# Patient Record
Sex: FEMALE | Race: WHITE | NOT HISPANIC OR LATINO | Employment: STUDENT | URBAN - METROPOLITAN AREA
[De-identification: names, ages, dates, MRNs, and addresses within clinical notes are randomized per-mention and may not be internally consistent; named-entity substitution may affect disease eponyms.]

---
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
## 2024-02-11 DIAGNOSIS — F41.1 GAD (GENERALIZED ANXIETY DISORDER): ICD-10-CM

## 2024-02-12 RX ORDER — PROPRANOLOL HYDROCHLORIDE 10 MG/1
10 TABLET ORAL 2 TIMES DAILY PRN
Qty: 180 TABLET | Refills: 0 | Status: SHIPPED | OUTPATIENT
Start: 2024-02-12 | End: 2024-05-16 | Stop reason: SDUPTHER

## 2024-05-06 ENCOUNTER — TELEPHONE (OUTPATIENT)
Dept: PSYCHIATRY | Facility: CLINIC | Age: 19
End: 2024-05-06
Payer: COMMERCIAL

## 2024-05-06 NOTE — TELEPHONE ENCOUNTER
----- Message from Shayna Taylor MA sent at 5/6/2024 10:42 AM CDT -----  Regarding: FW: r/s request    ----- Message -----  From: Drea Bojorquez LPN  Sent: 3/21/2024   4:56 PM CDT  To: Shayna Taylor MA  Subject: FW: r/s request                                    ----- Message -----  From: Memo Mullins  Sent: 3/21/2024   4:52 PM CDT  To: Alfredo Suarez Staff  Subject: r/s request                                      PATIENT CALL    Pt 's mom Sammi called regarding cancelled appt for tomorrow. Pt is in school 1.5 hrs away and has a job, Friday appts work best, please call back at [phone]

## 2024-05-16 ENCOUNTER — OFFICE VISIT (OUTPATIENT)
Dept: PSYCHIATRY | Facility: CLINIC | Age: 19
End: 2024-05-16
Payer: COMMERCIAL

## 2024-05-16 VITALS
HEIGHT: 65 IN | HEART RATE: 72 BPM | BODY MASS INDEX: 22.08 KG/M2 | SYSTOLIC BLOOD PRESSURE: 112 MMHG | DIASTOLIC BLOOD PRESSURE: 73 MMHG | WEIGHT: 132.5 LBS

## 2024-05-16 DIAGNOSIS — F12.10 TETRAHYDROCANNABINOL (THC) USE DISORDER, MILD, ABUSE: ICD-10-CM

## 2024-05-16 DIAGNOSIS — F32.9 REACTIVE DEPRESSION: ICD-10-CM

## 2024-05-16 DIAGNOSIS — F41.1 GAD (GENERALIZED ANXIETY DISORDER): Primary | ICD-10-CM

## 2024-05-16 DIAGNOSIS — F41.8 OTHER SPECIFIED ANXIETY DISORDERS: ICD-10-CM

## 2024-05-16 DIAGNOSIS — F39 MOOD DISORDER: ICD-10-CM

## 2024-05-16 PROCEDURE — 99214 OFFICE O/P EST MOD 30 MIN: CPT | Mod: S$GLB,,, | Performed by: REGISTERED NURSE

## 2024-05-16 PROCEDURE — 90833 PSYTX W PT W E/M 30 MIN: CPT | Mod: S$GLB,,, | Performed by: REGISTERED NURSE

## 2024-05-16 PROCEDURE — 3078F DIAST BP <80 MM HG: CPT | Mod: CPTII,S$GLB,, | Performed by: REGISTERED NURSE

## 2024-05-16 PROCEDURE — 3008F BODY MASS INDEX DOCD: CPT | Mod: CPTII,S$GLB,, | Performed by: REGISTERED NURSE

## 2024-05-16 PROCEDURE — 99999 PR PBB SHADOW E&M-EST. PATIENT-LVL III: CPT | Mod: PBBFAC,,, | Performed by: REGISTERED NURSE

## 2024-05-16 PROCEDURE — 3074F SYST BP LT 130 MM HG: CPT | Mod: CPTII,S$GLB,, | Performed by: REGISTERED NURSE

## 2024-05-16 PROCEDURE — 1159F MED LIST DOCD IN RCRD: CPT | Mod: CPTII,S$GLB,, | Performed by: REGISTERED NURSE

## 2024-05-16 PROCEDURE — 1160F RVW MEDS BY RX/DR IN RCRD: CPT | Mod: CPTII,S$GLB,, | Performed by: REGISTERED NURSE

## 2024-05-16 PROCEDURE — G2211 COMPLEX E/M VISIT ADD ON: HCPCS | Mod: S$GLB,,, | Performed by: REGISTERED NURSE

## 2024-05-16 RX ORDER — LAMOTRIGINE 25 MG/1
25 TABLET ORAL DAILY
Qty: 30 TABLET | Refills: 1 | Status: SHIPPED | OUTPATIENT
Start: 2024-05-16 | End: 2024-06-18 | Stop reason: SDUPTHER

## 2024-05-16 RX ORDER — PROPRANOLOL HYDROCHLORIDE 10 MG/1
10 TABLET ORAL 2 TIMES DAILY PRN
Qty: 180 TABLET | Refills: 0 | Status: SHIPPED | OUTPATIENT
Start: 2024-05-16 | End: 2025-05-16

## 2024-05-16 NOTE — PROGRESS NOTES
Outpatient Psychiatry Follow-Up Visit (MD/NP)    5/16/2024    Clinical Status of Patient:  Outpatient (Ambulatory)    Chief Complaint:  Olivia Magaña is a 18 y.o. female who presents today for follow-up of depression and anxiety.  Met with patient.    Grade: College [employer]  School:  [employer]   Child lives with: parents/ house partner and bF    Interval History and Content of Current Session:  Interim Events/Subjective Report/Content of Current Session:  Patient reports negative self thoughts and me in math frequently.  Often feels confused although has improved since stopping birth control.  Reports her anger and irritability have worsened since late March.  Feels like emotions are all over the place at times.  States show have episodes of crying, mixed with anger and feelings of overwhelmed.  Does admit to recent thoughts that are difficult to redirect.  Also reports when overly stressed patient does have significant increase in focus towards interest.  Although admits to being easily overstimulated, often when the house is messy.  Maintained a 3.9 GPA this semester.  Reports working on communication with girlfriend.  Admits to spending more money recently.  Reports sleep is good overall.  Reports good appetite.    Psychotherapy:  Target symptoms: mood swings  Why chosen therapy is appropriate versus another modality: relevant to diagnosis, patient responds to this modality, evidence based practice  Outcome monitoring methods: self-report, observation  Therapeutic intervention type: insight oriented psychotherapy, interactive psychotherapy  Topics discussed/themes: relationships difficulties, building skills sets for symptom management  The patient's response to the intervention is accepting. The patient's progress toward treatment goals is fair.   Duration of intervention: 18 minutes.  Discussed patient's relationships with peers and roommates.  Discussed patient's fluctuations in mood and  irritability.  Discussed ways to improve relationships and communications with others.      12/28/2023:  Patient reports doing well overall with depression.  However does admit to some seasonal depression but I am sailing through it.  Reports passing all classes this semester and is excited about upcoming semester in classes.  Enjoys work and feels not judged to current job.  Feels like her house is home but often becomes overwhelmed trying to maintain it due to lack of support from others and home.  Admits to smoking marijuana and THC multiple times weekly.  Reports daily nausea without otherwise noticeable trigger.  Also reports taking propanolol daily but continues to have occasional episodes of breakthrough anxiety.  Otherwise denies noticeable side effects of medications.  Reports fair to good sleep.  Reports fair appetite.    08/17/2023:  Patient admits to some increase in irritability, however is very aware of it.  Trying to work on irritability independently with coping skills.  Does report is able to let aggravation go quicker since aware of irritability.  Reports more aware of emotions then while on antidepressant.  Does report some difficulty falling asleep, although reports it is not bothersome.  Continues using propanolol almost daily for anxiety.  Has moved in to the house this week with her partner, although is home today for appointments.  Reports classes start Monday.      10/7/2021-initial evaluation: Patient is a 16-year-old female who presents to clinic today for initial psychiatric evaluation by this provider.  Patient presents with complaints of anxiety and depression.  Patient's grandmother is present with patient during interview.  Patient reports wanting to go to [employer] for forensic anthropology.  States she has had anxiety for most of her life, however it has been way worse in the past year.  Reports tic starting in April that affected the neck torso and caused the  patient to home.  States the tics affect patient dated days.  Patient often fixates on different things such as talking to people and forgets keys or other things.  Depression started approximately 2 years ago.  Currently the patient sees Pam read an occupational therapist online for [facility] for the tics for approximately 4 weeks.  Reports the [facility] seems to be helping.  Patient admits to crying daily when taking the school due to anxiety.  Often she has difficulty in public places and meeting new people, or even being alone by herself at home.  Patient has panic attacks that feel like a weight on her chest, unable to talk, excessive crying, moving arms around leading to her tics.  Patient reports having abandonment issues from people.  States she gets really close to her friends and when someone moves or she feels as if she is blocked from their life is very difficult for the patient.  The patient has started on Zoloft and reports she can feel herself being sad or cry by herself.  Does report it has affected her appetite somewhat.  Patient admits to increased depression and irritability the week before her menstrual cycle.  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  Patient denies current suicidal ideations, homicidal ideation, thoughts of self-harm, paranoia and hallucinations.       Patient  reviewed this visit.     Review of Systems   PSYCHIATRIC: Pertinant items are noted in the narrative.    Past Medical, Family and Social History: The patient's past medical, family and social history have been reviewed and updated as appropriate within the electronic medical record - see encounter notes.    Compliance:  See above    Side effects: see above    Risk Parameters:  Patient reports no suicidal ideation  Patient reports no homicidal ideation  Patient reports no self-injurious behavior  Patient reports no violent behavior    Exam (detailed: at least 9 elements; comprehensive:  "all 15 elements)         8/17/2023    12:56 PM 8/16/2023     6:21 PM 7/7/2023    10:43 AM   GAD7   1. Feeling nervous, anxious, or on edge? 1 1 1   2. Not being able to stop or control worrying? 2 2 1   3. Worrying too much about different things? 2 2 1   4. Trouble relaxing? 3 3 1   5. Being so restless that it is hard to sit still? 1 1 1   6. Becoming easily annoyed or irritable? 3 3 1   7. Feeling afraid as if something awful might happen? 1 1 1   8. If you checked off any problems, how difficult have these problems made it for you to do your work, take care of things at home, or get along with other people? 1 1 1   CHARLES-7 Score 13 13    13 7          No data to display              Constitutional  Vitals:  Most recent vital signs, dated less than 90 days prior to this appointment, were reviewed.   Vitals:    05/16/24 1313   BP: 112/73   Pulse: 72   Weight: 60.1 kg (132 lb 7.9 oz)   Height: 5' 5" (1.651 m)      General:  unremarkable, age appropriate     Musculoskeletal  Muscle Strength/Tone:  no spasicity, no rigidity, no flaccidity   Gait & Station:  non-ataxic     Psychiatric  Speech:  no latency; no press   Mood & Affect:  steady  congruent and appropriate   Thought Process:  normal and logical   Associations:  intact   Thought Content:  normal, no suicidality, no homicidality, delusions, or paranoia   Insight:  has awareness of illness   Judgement: age appropriate   Orientation:  grossly intact   Memory: intact for content of interview   Language: grossly intact   Attention Span & Concentration:  able to focus   Fund of Knowledge:  intact and appropriate to age and level of education, familiar with aspects of current personal life     Assessment and Diagnosis   Status/Progress: Based on the examination today, the patient's problem(s) is/are inadequately controlled.  New problems have been presented today.   Co-morbidities are not complicating management of the primary condition.       General Impression:  " Patient reports regression in mood swings and irritability.  Although does report improvement maintained in motivation and anxiety.  Reports continued use of marijuana.  Continues to use propanolol daily for anxiety.  Denies noticeable side effects of medications.  Discussed starting lamotrigine for mood fluctuations.  Discussed risks versus benefits of medication changes.  Patient agreeable to current treatment plan.  Patient denies suicidal ideations, homicidal ideations, thoughts of self-harm, paranoia and hallucinations.    Visit today included increased complexity associated with the care of the episodic problem mood, anxiety, and THC use addressed and managing the longitudinal care of the patient due to the serious and/or complex managed problem(s) mood, anxiety, and THC use.      ICD-10-CM ICD-9-CM   1. CHARLES (generalized anxiety disorder)  F41.1 300.02   2. Mood disorder  F39 296.90   3. Reactive depression  F32.9 300.4   4. Other specified anxiety disorders  F41.8 300.09   5. Tetrahydrocannabinol (THC) use disorder, mild, abuse  F12.10 305.20   Rule out PMDD    Intervention/Counseling/Treatment Plan   Medication Management: The risks and benefits of medication were discussed with the patient.  Counseling provided with patient and family as follows: importance of compliance with chosen treatment options was emphasized, risks and benefits of treatment options, including medications, were discussed with the patient, prognosis, patient and family education, instructions for  management, treatment and follow-up were reviewed   Educated on Black Box warning for SSRI's with younger patients and suicidality. Instructed to go to ER or call 911 if thoughts of suicide begin or worsen. Patient verbalized understanding.   Discussed risk of serotonin syndrome with these medications. Symptoms of concern include agitation/restlessness, confusion, rapid heart rate/high blood pressure, dilated pupils, loss of muscle  coordination, muscle rigidity, heavy sweating.  Discussed with individual potential for birth defects and possible other adverse impact upon pregnancy and maternal/fetal health while taking psychotropic medications.   Discussed with patient informed consent, risks vs. benefits, alternative treatments, side effect profile and the inherent unpredictability of individual responses to these treatments. The patient expresses understanding of the above and displays the capacity to agree with this current plan and had no other questions.      Medications:   Start lamotrigine 25 mg by mouth daily.  May take take Propanolol 10 mg by mouth twice daily as needed for anxiety.    Return to Clinic: 1 month     Total time spent with patient and chart:  33 minutes    Patient instructed to please go to emergency department if feeling as though you are going to harm to yourself or others or if you are in crisis; or to please call the clinic to report any worsening of symptoms or problems associated with medication.     A portion of this note was created using bubl voice recognition software that occasionally misinterprets phrases or words.

## 2024-05-16 NOTE — PATIENT INSTRUCTIONS
Start Lamotrigine 25 mg by mouth daily.     May take take Propanolol 10 mg by mouth twice daily as needed for anxiety.             Please go to emergency department if feeling as though you are going to harm to yourself or others or if you are in crisis.     Please call the clinic to report any worsening of symptoms or problems associated with medication.    National Suicide Prevention Lifeline    The Lifeline provides 24/7, free and confidential support for people in distress, prevention and crisis resources for you or your loved ones, and best practices for professionals in the [place].    7-679-967-0035    988 has been designated as the new three-digit dialing code that will route callers to the National Suicide Prevention Lifeline. While some areas may be currently able to connect to the Lifeline by dialing 988, this dialing code will be available to everyone across the [place] starting on July 16, 2022.     988      Lifeline Chat    Lifeline Chat is a service of the National Suicide Prevention Lifeline, connecting individuals with counselors for emotional support and other services via web chat. All chat centers in the Lifeline network are accredited by [employer]. Lifeline Chat is available 24/7 across the [place]    https://suicidepreventionlifeline.org/chat/

## 2024-06-18 ENCOUNTER — OFFICE VISIT (OUTPATIENT)
Dept: PSYCHIATRY | Facility: CLINIC | Age: 19
End: 2024-06-18
Payer: COMMERCIAL

## 2024-06-18 VITALS
BODY MASS INDEX: 21.71 KG/M2 | WEIGHT: 130.31 LBS | HEIGHT: 65 IN | DIASTOLIC BLOOD PRESSURE: 69 MMHG | SYSTOLIC BLOOD PRESSURE: 121 MMHG | HEART RATE: 69 BPM

## 2024-06-18 DIAGNOSIS — F12.10 TETRAHYDROCANNABINOL (THC) USE DISORDER, MILD, ABUSE: ICD-10-CM

## 2024-06-18 DIAGNOSIS — F41.1 GAD (GENERALIZED ANXIETY DISORDER): Primary | ICD-10-CM

## 2024-06-18 DIAGNOSIS — F32.9 REACTIVE DEPRESSION: ICD-10-CM

## 2024-06-18 DIAGNOSIS — F39 MOOD DISORDER: ICD-10-CM

## 2024-06-18 PROCEDURE — 3078F DIAST BP <80 MM HG: CPT | Mod: CPTII,S$GLB,, | Performed by: REGISTERED NURSE

## 2024-06-18 PROCEDURE — 1159F MED LIST DOCD IN RCRD: CPT | Mod: CPTII,S$GLB,, | Performed by: REGISTERED NURSE

## 2024-06-18 PROCEDURE — G2211 COMPLEX E/M VISIT ADD ON: HCPCS | Mod: S$GLB,,, | Performed by: REGISTERED NURSE

## 2024-06-18 PROCEDURE — 99214 OFFICE O/P EST MOD 30 MIN: CPT | Mod: S$GLB,,, | Performed by: REGISTERED NURSE

## 2024-06-18 PROCEDURE — 3008F BODY MASS INDEX DOCD: CPT | Mod: CPTII,S$GLB,, | Performed by: REGISTERED NURSE

## 2024-06-18 PROCEDURE — 1160F RVW MEDS BY RX/DR IN RCRD: CPT | Mod: CPTII,S$GLB,, | Performed by: REGISTERED NURSE

## 2024-06-18 PROCEDURE — 3074F SYST BP LT 130 MM HG: CPT | Mod: CPTII,S$GLB,, | Performed by: REGISTERED NURSE

## 2024-06-18 PROCEDURE — 99999 PR PBB SHADOW E&M-EST. PATIENT-LVL IV: CPT | Mod: PBBFAC,,, | Performed by: REGISTERED NURSE

## 2024-06-18 RX ORDER — LAMOTRIGINE 25 MG/1
25 TABLET ORAL DAILY
Qty: 90 TABLET | Refills: 0 | Status: SHIPPED | OUTPATIENT
Start: 2024-06-18 | End: 2024-09-16

## 2024-06-18 NOTE — PROGRESS NOTES
"Outpatient Psychiatry Follow-Up Visit (MD/NP)    6/18/2024    Clinical Status of Patient:  Outpatient (Ambulatory)    Chief Complaint:  Olivia Magaña is a 18 y.o. female who presents today for follow-up of depression and anxiety.  Met with patient.    Grade: College Soph[employer]  School:  [employer]   Child lives with: parents/ house partner and bF  Job: [employer]    Interval History and Content of Current Session:  Interim Events/Subjective Report/Content of Current Session:  Patient reports initial fogginess while starting lamotrigine.   However feels fog as has improved.  Also reports irritability is decreased.  Continues to have mood swings although feels that moods are less overwhelming.  Does report having to tell roommate to move out leading to increased anxiety, but patient did not "lash out".  Feels this is improvement as well.  Denies noticeable side effects of medications otherwise.  Reports fair to good sleep.  Reports fair to good appetite.    05/16/2024:  Patient reports negative self thoughts and me in math frequently.  Often feels confused although has improved since stopping birth control.  Reports her anger and irritability have worsened since late March.  Feels like emotions are all over the place at times.  States show have episodes of crying, mixed with anger and feelings of overwhelmed.  Does admit to recent thoughts that are difficult to redirect.  Also reports when overly stressed patient does have significant increase in focus towards interest.  Although admits to being easily overstimulated, often when the house is messy.  Maintained a 3.9 GPA this semester.  Reports working on communication with girlfriend.  Admits to spending more money recently.  Reports sleep is good overall.  Reports good appetite.  Psychotherapy: Discussed patient's relationships with peers and roommates.  Discussed patient's fluctuations in mood and irritability.  Discussed ways to improve " relationships and communications with others.    12/28/2023:  Patient reports doing well overall with depression.  However does admit to some seasonal depression but I am sailing through it.  Reports passing all classes this semester and is excited about upcoming semester in classes.  Enjoys work and feels not judged to current job.  Feels like her house is home but often becomes overwhelmed trying to maintain it due to lack of support from others and home.  Admits to smoking marijuana and THC multiple times weekly.  Reports daily nausea without otherwise noticeable trigger.  Also reports taking propanolol daily but continues to have occasional episodes of breakthrough anxiety.  Otherwise denies noticeable side effects of medications.  Reports fair to good sleep.  Reports fair appetite.      10/7/2021-initial evaluation: Patient is a 16-year-old female who presents to clinic today for initial psychiatric evaluation by this provider.  Patient presents with complaints of anxiety and depression.  Patient's grandmother is present with patient during interview.  Patient reports wanting to go to [employer] [place] for forensic anthropology.  States she has had anxiety for most of her life, however it has been way worse in the past year.  Reports tic starting in April that affected the neck torso and caused the patient to home.  States the tics affect patient dated days.  Patient often fixates on different things such as talking to people and forgets keys or other things.  Depression started approximately 2 years ago.  Currently the patient sees Pam read an occupational therapist online for [facility] for the tics for approximately 4 weeks.  Reports the [facility] seems to be helping.  Patient admits to crying daily when taking the school due to anxiety.  Often she has difficulty in public places and meeting new people, or even being alone by herself at home.  Patient has panic attacks that feel like a weight on her chest,  unable to talk, excessive crying, moving arms around leading to her tics.  Patient reports having abandonment issues from people.  States she gets really close to her friends and when someone moves or she feels as if she is blocked from their life is very difficult for the patient.  The patient has started on Zoloft and reports she can feel herself being sad or cry by herself.  Does report it has affected her appetite somewhat.  Patient admits to increased depression and irritability the week before her menstrual cycle.  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  Patient denies current suicidal ideations, homicidal ideation, thoughts of self-harm, paranoia and hallucinations.       Patient  reviewed this visit.     Review of Systems   PSYCHIATRIC: Pertinant items are noted in the narrative.    Past Medical, Family and Social History: The patient's past medical, family and social history have been reviewed and updated as appropriate within the electronic medical record - see encounter notes.    Compliance:  See above    Side effects: see above    Risk Parameters:  Patient reports no suicidal ideation  Patient reports no homicidal ideation  Patient reports no self-injurious behavior  Patient reports no violent behavior    Exam (detailed: at least 9 elements; comprehensive: all 15 elements)         8/17/2023    12:56 PM 8/16/2023     6:21 PM 7/7/2023    10:43 AM   GAD7   1. Feeling nervous, anxious, or on edge? 1 1 1   2. Not being able to stop or control worrying? 2 2 1   3. Worrying too much about different things? 2 2 1   4. Trouble relaxing? 3 3 1   5. Being so restless that it is hard to sit still? 1 1 1   6. Becoming easily annoyed or irritable? 3 3 1   7. Feeling afraid as if something awful might happen? 1 1 1   8. If you checked off any problems, how difficult have these problems made it for you to do your work, take care of things at home, or get along with other  "people? 1 1 1   CHARLES-7 Score 13 13    13 7          No data to display              Constitutional  Vitals:  Most recent vital signs, dated less than 90 days prior to this appointment, were reviewed.   Vitals:    06/18/24 1100   BP: 121/69   Pulse: 69   Weight: 59.1 kg (130 lb 4.7 oz)   Height: 5' 5" (1.651 m)        General:  unremarkable, age appropriate     Musculoskeletal  Muscle Strength/Tone:  no spasicity, no rigidity, no flaccidity   Gait & Station:  non-ataxic     Psychiatric  Speech:  no latency; no press   Mood & Affect:  steady  congruent and appropriate   Thought Process:  normal and logical   Associations:  intact   Thought Content:  normal, no suicidality, no homicidality, delusions, or paranoia   Insight:  has awareness of illness   Judgement: age appropriate   Orientation:  grossly intact   Memory: intact for content of interview   Language: grossly intact   Attention Span & Concentration:  able to focus   Fund of Knowledge:  intact and appropriate to age and level of education, familiar with aspects of current personal life     Assessment and Diagnosis   Status/Progress: Based on the examination today, the patient's problem(s) is/are adequately but not ideally controlled.  New problems have been presented today.   Co-morbidities are not complicating management of the primary condition.       General Impression:  Patient reports mild improvement in mood swings and irritability. Continued improvement maintained in motivation and anxiety.  Reports continued use of marijuana.   Does report decreased use of propanolol for anxiety.  Denies noticeable side effects of medications.   Denies wanting change to medication at this time.  Patient agreeable to current treatment plan.  Patient denies suicidal ideations, homicidal ideations, thoughts of self-harm, paranoia and hallucinations.    Visit today included increased complexity associated with the care of the episodic problem mood, anxiety, and THC use " addressed and managing the longitudinal care of the patient due to the serious and/or complex managed problem(s) mood, anxiety, and THC use.      ICD-10-CM ICD-9-CM   1. CHARLES (generalized anxiety disorder)  F41.1 300.02   2. Mood disorder  F39 296.90   3. Reactive depression  F32.9 300.4   4. Tetrahydrocannabinol (THC) use disorder, mild, abuse  F12.10 305.20     Rule out PMDD    Intervention/Counseling/Treatment Plan   Medication Management: The risks and benefits of medication were discussed with the patient.  Counseling provided with patient and family as follows: importance of compliance with chosen treatment options was emphasized, risks and benefits of treatment options, including medications, were discussed with the patient, prognosis, patient and family education, instructions for  management, treatment and follow-up were reviewed   Educated on Black Box warning for SSRI's with younger patients and suicidality. Instructed to go to ER or call 911 if thoughts of suicide begin or worsen. Patient verbalized understanding.   Discussed risk of serotonin syndrome with these medications. Symptoms of concern include agitation/restlessness, confusion, rapid heart rate/high blood pressure, dilated pupils, loss of muscle coordination, muscle rigidity, heavy sweating.  Discussed with individual potential for birth defects and possible other adverse impact upon pregnancy and maternal/fetal health while taking psychotropic medications.   Discussed with patient informed consent, risks vs. benefits, alternative treatments, side effect profile and the inherent unpredictability of individual responses to these treatments. The patient expresses understanding of the above and displays the capacity to agree with this current plan and had no other questions.      Medications:   Continue lamotrigine 25 mg by mouth daily.  May take take Propanolol 10 mg by mouth twice daily as needed for anxiety.    Return to Clinic: 2 months      Patient instructed to please go to emergency department if feeling as though you are going to harm to yourself or others or if you are in crisis; or to please call the clinic to report any worsening of symptoms or problems associated with medication.     A portion of this note was created using [employer] voice recognition software that occasionally misinterprets phrases or words.

## 2024-06-18 NOTE — PATIENT INSTRUCTIONS
Continue Lamotrigine 25 mg by mouth daily.     May take take Propanolol 10 mg by mouth twice daily as needed for anxiety.             Please go to emergency department if feeling as though you are going to harm to yourself or others or if you are in crisis.     Please call the clinic to report any worsening of symptoms or problems associated with medication.    National Suicide Prevention Lifeline    The Lifeline provides 24/7, free and confidential support for people in distress, prevention and crisis resources for you or your loved ones, and best practices for professionals in the [place].    [phone]    988 has been designated as the new three-digit dialing code that will route callers to the National Suicide Prevention Lifeline. While some areas may be currently able to connect to the Lifeline by dialing 988, this dialing code will be available to everyone across the [place] starting on July 16, 2022.     988      Lifeline Chat    Lifeline Chat is a service of the National Suicide Prevention Lifeline, connecting individuals with counselors for emotional support and other services via web chat. All chat centers in the Lifeline network are accredited by [employer]. Lifeline Chat is available 24/7 across the [place]    [email or web address]e.org/chat/

## 2024-07-11 DIAGNOSIS — F39 MOOD DISORDER: ICD-10-CM

## 2024-07-11 DIAGNOSIS — F41.1 GAD (GENERALIZED ANXIETY DISORDER): ICD-10-CM

## 2024-07-11 RX ORDER — PROPRANOLOL HYDROCHLORIDE 10 MG/1
10 TABLET ORAL 2 TIMES DAILY PRN
Qty: 180 TABLET | Refills: 0 | Status: SHIPPED | OUTPATIENT
Start: 2024-07-11 | End: 2025-07-11

## 2024-07-11 RX ORDER — LAMOTRIGINE 25 MG/1
25 TABLET ORAL DAILY
Qty: 90 TABLET | Refills: 0 | Status: SHIPPED | OUTPATIENT
Start: 2024-07-11 | End: 2024-10-09

## 2024-07-11 NOTE — TELEPHONE ENCOUNTER
Pt's mom called to say that pt has moved to [place] for college and needs her Rxs transferred to [facility] in [place].

## 2024-08-20 ENCOUNTER — OFFICE VISIT (OUTPATIENT)
Dept: PSYCHIATRY | Facility: CLINIC | Age: 19
End: 2024-08-20
Payer: COMMERCIAL

## 2024-08-20 VITALS
WEIGHT: 127.44 LBS | SYSTOLIC BLOOD PRESSURE: 119 MMHG | HEART RATE: 123 BPM | HEIGHT: 65 IN | BODY MASS INDEX: 21.23 KG/M2 | DIASTOLIC BLOOD PRESSURE: 84 MMHG

## 2024-08-20 DIAGNOSIS — F12.10 TETRAHYDROCANNABINOL (THC) USE DISORDER, MILD, ABUSE: ICD-10-CM

## 2024-08-20 DIAGNOSIS — F39 MOOD DISORDER: ICD-10-CM

## 2024-08-20 DIAGNOSIS — F41.8 OTHER SPECIFIED ANXIETY DISORDERS: ICD-10-CM

## 2024-08-20 DIAGNOSIS — F41.1 GAD (GENERALIZED ANXIETY DISORDER): Primary | ICD-10-CM

## 2024-08-20 PROCEDURE — 99999 PR PBB SHADOW E&M-EST. PATIENT-LVL III: CPT | Mod: PBBFAC,,, | Performed by: REGISTERED NURSE

## 2024-08-20 PROCEDURE — 3074F SYST BP LT 130 MM HG: CPT | Mod: CPTII,S$GLB,, | Performed by: REGISTERED NURSE

## 2024-08-20 PROCEDURE — 99214 OFFICE O/P EST MOD 30 MIN: CPT | Mod: S$GLB,,, | Performed by: REGISTERED NURSE

## 2024-08-20 PROCEDURE — 3079F DIAST BP 80-89 MM HG: CPT | Mod: CPTII,S$GLB,, | Performed by: REGISTERED NURSE

## 2024-08-20 PROCEDURE — 1160F RVW MEDS BY RX/DR IN RCRD: CPT | Mod: CPTII,S$GLB,, | Performed by: REGISTERED NURSE

## 2024-08-20 PROCEDURE — G2211 COMPLEX E/M VISIT ADD ON: HCPCS | Mod: S$GLB,,, | Performed by: REGISTERED NURSE

## 2024-08-20 PROCEDURE — 1159F MED LIST DOCD IN RCRD: CPT | Mod: CPTII,S$GLB,, | Performed by: REGISTERED NURSE

## 2024-08-20 PROCEDURE — 3008F BODY MASS INDEX DOCD: CPT | Mod: CPTII,S$GLB,, | Performed by: REGISTERED NURSE

## 2024-08-20 RX ORDER — LAMOTRIGINE 25 MG/1
25 TABLET ORAL DAILY
Qty: 90 TABLET | Refills: 0 | Status: SHIPPED | OUTPATIENT
Start: 2024-08-20 | End: 2024-11-18

## 2024-08-20 NOTE — PROGRESS NOTES
"Outpatient Psychiatry Follow-Up Visit (MD/NP)    8/20/2024    Clinical Status of Patient:  Outpatient (Ambulatory)    Chief Complaint:  Olivia Magaña is a 19 y.o. female who presents today for follow-up of depression and anxiety.  Met with patient.    Grade: College Soph[employer]  School:  [employer] - anthropology/ studies  Child lives with: parents/ house partner and bF  Job: [employer]    Interval History and Content of Current Session:  Interim Events/Subjective Report/Content of Current Session:  Reports significant improvement in irritability.  States getting along with new roommate well.  Does report some increase in anxiety over school starting back.  States taking 6 classes this semester.  Does report that propanolol seems to be not as effective as previously for anxiety episodes.  Otherwise denies noticeable side effects of medications.  Reports good sleep.  Reports good appetite.    06/18/2024:  Patient reports initial fogginess while starting lamotrigine.   However feels fog as has improved.  Also reports irritability is decreased.  Continues to have mood swings although feels that moods are less overwhelming.  Does report having to tell roommate to move out leading to increased anxiety, but patient did not "lash out".  Feels this is improvement as well.  Denies noticeable side effects of medications otherwise.  Reports fair to good sleep.  Reports fair to good appetite.    05/16/2024:  Patient reports negative self thoughts and me in math frequently.  Often feels confused although has improved since stopping birth control.  Reports her anger and irritability have worsened since late March.  Feels like emotions are all over the place at times.  States show have episodes of crying, mixed with anger and feelings of overwhelmed.  Does admit to recent thoughts that are difficult to redirect.  Also reports when overly stressed patient does have significant increase in focus " towards interest.  Although admits to being easily overstimulated, often when the house is messy.  Maintained a 3.9 GPA this semester.  Reports working on communication with girlfriend.  Admits to spending more money recently.  Reports sleep is good overall.  Reports good appetite.  Psychotherapy: Discussed patient's relationships with peers and roommates.  Discussed patient's fluctuations in mood and irritability.  Discussed ways to improve relationships and communications with others.      10/7/2021-initial evaluation: Patient is a 16-year-old female who presents to clinic today for initial psychiatric evaluation by this provider.  Patient presents with complaints of anxiety and depression.  Patient's grandmother is present with patient during interview.  Patient reports wanting to go to [employer] for forensic anthropology.  States she has had anxiety for most of her life, however it has been way worse in the past year.  Reports tic starting in April that affected the neck torso and caused the patient to home.  States the tics affect patient dated days.  Patient often fixates on different things such as talking to people and forgets keys or other things.  Depression started approximately 2 years ago.  Currently the patient sees Pam read an occupational therapist online for [facility] for the tics for approximately 4 weeks.  Reports the [facility] seems to be helping.  Patient admits to crying daily when taking the school due to anxiety.  Often she has difficulty in public places and meeting new people, or even being alone by herself at home.  Patient has panic attacks that feel like a weight on her chest, unable to talk, excessive crying, moving arms around leading to her tics.  Patient reports having abandonment issues from people.  States she gets really close to her friends and when someone moves or she feels as if she is blocked from their life is very difficult for the patient.  The patient has started  "on Zoloft and reports she can feel herself being sad or cry by herself.  Does report it has affected her appetite somewhat.  Patient admits to increased depression and irritability the week before her menstrual cycle.  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  Patient denies current suicidal ideations, homicidal ideation, thoughts of self-harm, paranoia and hallucinations.       Patient  reviewed this visit.     Review of Systems   PSYCHIATRIC: Pertinant items are noted in the narrative.    Past Medical, Family and Social History: The patient's past medical, family and social history have been reviewed and updated as appropriate within the electronic medical record - see encounter notes.    Compliance:  See above    Side effects: see above    Risk Parameters:  Patient reports no suicidal ideation  Patient reports no homicidal ideation  Patient reports no self-injurious behavior  Patient reports no violent behavior    Exam (detailed: at least 9 elements; comprehensive: all 15 elements)         8/17/2023    12:56 PM 8/16/2023     6:21 PM 7/7/2023    10:43 AM   GAD7   1. Feeling nervous, anxious, or on edge? 1 1 1   2. Not being able to stop or control worrying? 2 2 1   3. Worrying too much about different things? 2 2 1   4. Trouble relaxing? 3 3 1   5. Being so restless that it is hard to sit still? 1 1 1   6. Becoming easily annoyed or irritable? 3 3 1   7. Feeling afraid as if something awful might happen? 1 1 1   8. If you checked off any problems, how difficult have these problems made it for you to do your work, take care of things at home, or get along with other people? 1 1 1   CHARLES-7 Score 13 13    13 7          No data to display              Constitutional  Vitals:  Most recent vital signs, dated less than 90 days prior to this appointment, were reviewed.   Vitals:    08/20/24 1343   BP: 119/84   Pulse: (!) 123   Weight: 57.8 kg (127 lb 6.8 oz)   Height: 5' 5" (1.651 " m)          General:  unremarkable, age appropriate     Musculoskeletal  Muscle Strength/Tone:  no spasicity, no rigidity, no flaccidity   Gait & Station:  non-ataxic     Psychiatric  Speech:  no latency; no press   Mood & Affect:  steady  congruent and appropriate   Thought Process:  normal and logical   Associations:  intact   Thought Content:  normal, no suicidality, no homicidality, delusions, or paranoia   Insight:  has awareness of illness   Judgement: age appropriate   Orientation:  grossly intact   Memory: intact for content of interview   Language: grossly intact   Attention Span & Concentration:  able to focus   Fund of Knowledge:  intact and appropriate to age and level of education, familiar with aspects of current personal life     Assessment and Diagnosis   Status/Progress: Based on the examination today, the patient's problem(s) is/are adequately but not ideally controlled.  New problems have been presented today.   Co-morbidities are not complicating management of the primary condition.       General Impression:  Patient reports mild improvement in mood swings and irritability. Continued improvement in motivation.  Reports some increase in anxiety related to resuming school.  Reports decreased use of marijuana.  Denies noticeable side effects of medications.   Discussed increasing propanolol for anxiety.  Discussed risks versus benefits of medication changes.  Patient agreeable to current treatment plan.  Patient denies suicidal ideations, homicidal ideations, thoughts of self-harm, paranoia and hallucinations.    Visit today included increased complexity associated with the care of the episodic problem mood, anxiety, and THC use addressed and managing the longitudinal care of the patient due to the serious and/or complex managed problem(s) mood, anxiety, and THC use.      ICD-10-CM ICD-9-CM   1. CHARLES (generalized anxiety disorder)  F41.1 300.02   2. Mood disorder  F39 296.90   3. Tetrahydrocannabinol  (THC) use disorder, mild, abuse  F12.10 305.20   4. Other specified anxiety disorders  F41.8 300.09     Rule out PMDD    Intervention/Counseling/Treatment Plan   Medication Management: The risks and benefits of medication were discussed with the patient.  Counseling provided with patient and family as follows: importance of compliance with chosen treatment options was emphasized, risks and benefits of treatment options, including medications, were discussed with the patient, prognosis, patient and family education, instructions for  management, treatment and follow-up were reviewed   Educated on Black Box warning for SSRI's with younger patients and suicidality. Instructed to go to ER or call 911 if thoughts of suicide begin or worsen. Patient verbalized understanding.   Discussed risk of serotonin syndrome with these medications. Symptoms of concern include agitation/restlessness, confusion, rapid heart rate/high blood pressure, dilated pupils, loss of muscle coordination, muscle rigidity, heavy sweating.  Discussed with individual potential for birth defects and possible other adverse impact upon pregnancy and maternal/fetal health while taking psychotropic medications.   Discussed with patient informed consent, risks vs. benefits, alternative treatments, side effect profile and the inherent unpredictability of individual responses to these treatments. The patient expresses understanding of the above and displays the capacity to agree with this current plan and had no other questions.      Medications:   Continue lamotrigine 25 mg by mouth daily.  Increase: May take take Propanolol 20 mg by mouth twice daily as needed for anxiety.       Return to Clinic: 3 months     Patient instructed to please go to emergency department if feeling as though you are going to harm to yourself or others or if you are in crisis; or to please call the clinic to report any worsening of symptoms or problems associated with medication.      A portion of this note was created using [employer] voice recognition software that occasionally misinterprets phrases or words.

## 2024-08-20 NOTE — PATIENT INSTRUCTIONS
Continue Lamotrigine 25 mg by mouth daily.     Increase: May take take Propanolol 20 mg by mouth twice daily as needed for anxiety.             Please go to emergency department if feeling as though you are going to harm to yourself or others or if you are in crisis.     Please call the clinic to report any worsening of symptoms or problems associated with medication.    National Suicide Prevention Lifeline    The Lifeline provides 24/7, free and confidential support for people in distress, prevention and crisis resources for you or your loved ones, and best practices for professionals in the [place].    [phone]    988 has been designated as the new three-digit dialing code that will route callers to the National Suicide Prevention Lifeline. While some areas may be currently able to connect to the Lifeline by dialing 988, this dialing code will be available to everyone across the [place] starting on July 16, 2022.     988      Lifeline Chat    Lifeline Chat is a service of the National Suicide Prevention Lifeline, connecting individuals with counselors for emotional support and other services via web chat. All chat centers in the Lifeline network are accredited by [employer]. Lifeline Chat is available 24/7 across the [place]    [email or web address]e.org/chat/

## 2024-11-09 DIAGNOSIS — F41.1 GAD (GENERALIZED ANXIETY DISORDER): ICD-10-CM

## 2024-11-11 RX ORDER — PROPRANOLOL HYDROCHLORIDE 10 MG/1
10 TABLET ORAL 2 TIMES DAILY PRN
Qty: 60 TABLET | Refills: 0 | Status: SHIPPED | OUTPATIENT
Start: 2024-11-11 | End: 2025-11-11

## 2024-12-17 ENCOUNTER — OFFICE VISIT (OUTPATIENT)
Dept: PSYCHIATRY | Facility: CLINIC | Age: 19
End: 2024-12-17
Payer: COMMERCIAL

## 2024-12-17 VITALS
SYSTOLIC BLOOD PRESSURE: 109 MMHG | DIASTOLIC BLOOD PRESSURE: 76 MMHG | HEIGHT: 65 IN | HEART RATE: 70 BPM | WEIGHT: 124.13 LBS | BODY MASS INDEX: 20.68 KG/M2

## 2024-12-17 DIAGNOSIS — F39 MOOD DISORDER: ICD-10-CM

## 2024-12-17 DIAGNOSIS — F41.1 GAD (GENERALIZED ANXIETY DISORDER): Primary | ICD-10-CM

## 2024-12-17 DIAGNOSIS — F12.10 TETRAHYDROCANNABINOL (THC) USE DISORDER, MILD, ABUSE: ICD-10-CM

## 2024-12-17 DIAGNOSIS — F32.9 REACTIVE DEPRESSION: ICD-10-CM

## 2024-12-17 PROCEDURE — 3078F DIAST BP <80 MM HG: CPT | Mod: CPTII,S$GLB,, | Performed by: REGISTERED NURSE

## 2024-12-17 PROCEDURE — 1159F MED LIST DOCD IN RCRD: CPT | Mod: CPTII,S$GLB,, | Performed by: REGISTERED NURSE

## 2024-12-17 PROCEDURE — 1160F RVW MEDS BY RX/DR IN RCRD: CPT | Mod: CPTII,S$GLB,, | Performed by: REGISTERED NURSE

## 2024-12-17 PROCEDURE — 99999 PR PBB SHADOW E&M-EST. PATIENT-LVL III: CPT | Mod: PBBFAC,,, | Performed by: REGISTERED NURSE

## 2024-12-17 PROCEDURE — G2211 COMPLEX E/M VISIT ADD ON: HCPCS | Mod: S$GLB,,, | Performed by: REGISTERED NURSE

## 2024-12-17 PROCEDURE — 3074F SYST BP LT 130 MM HG: CPT | Mod: CPTII,S$GLB,, | Performed by: REGISTERED NURSE

## 2024-12-17 PROCEDURE — 3008F BODY MASS INDEX DOCD: CPT | Mod: CPTII,S$GLB,, | Performed by: REGISTERED NURSE

## 2024-12-17 PROCEDURE — 99214 OFFICE O/P EST MOD 30 MIN: CPT | Mod: S$GLB,,, | Performed by: REGISTERED NURSE

## 2024-12-17 RX ORDER — LAMOTRIGINE 25 MG/1
25 TABLET ORAL DAILY
Qty: 90 TABLET | Refills: 0 | Status: SHIPPED | OUTPATIENT
Start: 2024-12-17 | End: 2025-03-17

## 2024-12-17 NOTE — PATIENT INSTRUCTIONS
Continue Lamotrigine 25 mg by mouth daily.      May take take Propanolol 20 mg by mouth twice daily as needed for anxiety.             Please go to emergency department if feeling as though you are going to harm to yourself or others or if you are in crisis.     Please call the clinic to report any worsening of symptoms or problems associated with medication.    National Suicide Prevention Lifeline    The Lifeline provides 24/7, free and confidential support for people in distress, prevention and crisis resources for you or your loved ones, and best practices for professionals in the [place].    [phone]    988 has been designated as the new three-digit dialing code that will route callers to the National Suicide Prevention Lifeline. While some areas may be currently able to connect to the Lifeline by dialing 988, this dialing code will be available to everyone across the [place] starting on July 16, 2022.     988      Lifeline Chat    Lifeline Chat is a service of the National Suicide Prevention Lifeline, connecting individuals with counselors for emotional support and other services via web chat. All chat centers in the Lifeline network are accredited by [employer]. Lifeline Chat is available 24/7 across the [place]    https://suicidepreventionlifeline.org/chat/

## 2024-12-17 NOTE — PROGRESS NOTES
"Outpatient Psychiatry Follow-Up Visit (MD/NP)    12/17/2024    Clinical Status of Patient:  Outpatient (Ambulatory)    Chief Complaint:  Olivia Magaña is a 19 y.o. female who presents today for follow-up of depression and anxiety.  Met with patient.    Grade: College Soph[employer]  School:  [employer] - anthropology/ studies  Child lives with: parents/ roommates   Job: [employer]    Interval History and Content of Current Session:  Interim Events/Subjective Report/Content of Current Session:  Patient reports I am really happy.  Attending feels school programs in [place]vel [place] in the upcoming summer.  Recently broke up with girlfriend.  Made all A's this semester.  Attempting to find new roommate although trying to prevent drama in household.  Taking propanolol proximally 1 time per week for anxiety.  Otherwise denies noticeable side effects of medications.  Reports good sleep.  Reports good appetite.    08/20/2024:  Reports significant improvement in irritability.  States getting along with new roommate well.  Does report some increase in anxiety over school starting back.  States taking 6 classes this semester.  Does report that propanolol seems to :  be not as effective as previously for anxiety episodes.  Otherwise denies noticeable side effects of medications.  Reports good sleep.  Reports good appetite.    06/18/2024:  Patient reports initial fogginess while starting lamotrigine.   However feels fog as has improved.  Also reports irritability is decreased.  Continues to have mood swings although feels that moods are less overwhelming.  Does report having to tell roommate to move out leading to increased anxiety, but patient did not "lash out".  Feels this is improvement as well.  Denies noticeable side effects of medications otherwise.  Reports fair to good sleep.  Reports fair to good appetite.      10/7/2021-initial evaluation: Patient is a 16-year-old female who presents to " clinic today for initial psychiatric evaluation by this provider.  Patient presents with complaints of anxiety and depression.  Patient's grandmother is present with patient during interview.  Patient reports wanting to go to [employer] for forensic anthropology.  States she has had anxiety for most of her life, however it has been way worse in the past year.  Reports tic starting in April that affected the neck torso and caused the patient to home.  States the tics affect patient dated days.  Patient often fixates on different things such as talking to people and forgets keys or other things.  Depression started approximately 2 years ago.  Currently the patient sees Pam read an occupational therapist online for [facility] for the tics for approximately 4 weeks.  Reports the [facility] seems to be helping.  Patient admits to crying daily when taking the school due to anxiety.  Often she has difficulty in public places and meeting new people, or even being alone by herself at home.  Patient has panic attacks that feel like a weight on her chest, unable to talk, excessive crying, moving arms around leading to her tics.  Patient reports having abandonment issues from people.  States she gets really close to her friends and when someone moves or she feels as if she is blocked from their life is very difficult for the patient.  The patient has started on Zoloft and reports she can feel herself being sad or cry by herself.  Does report it has affected her appetite somewhat.  Patient admits to increased depression and irritability the week before her menstrual cycle.  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  Patient denies current suicidal ideations, homicidal ideation, thoughts of self-harm, paranoia and hallucinations.       Patient  reviewed this visit.     Review of Systems   PSYCHIATRIC: Pertinant items are noted in the narrative.    Past Medical, Family and Social  "History: The patient's past medical, family and social history have been reviewed and updated as appropriate within the electronic medical record - see encounter notes.    Compliance:  See above    Side effects: see above    Risk Parameters:  Patient reports no suicidal ideation  Patient reports no homicidal ideation  Patient reports no self-injurious behavior  Patient reports no violent behavior    Exam (detailed: at least 9 elements; comprehensive: all 15 elements)         8/17/2023    12:56 PM 8/16/2023     6:21 PM 7/7/2023    10:43 AM   GAD7   1. Feeling nervous, anxious, or on edge? 1  1  1    2. Not being able to stop or control worrying? 2  2  1    3. Worrying too much about different things? 2  2  1    4. Trouble relaxing? 3  3  1    5. Being so restless that it is hard to sit still? 1  1  1    6. Becoming easily annoyed or irritable? 3  3  1    7. Feeling afraid as if something awful might happen? 1  1  1    8. If you checked off any problems, how difficult have these problems made it for you to do your work, take care of things at home, or get along with other people? 1  1  1    CHARLES-7 Score 13 13    13 7       Patient-reported    Proxy-reported          No data to display              Constitutional  Vitals:  Most recent vital signs, dated less than 90 days prior to this appointment, were reviewed.   Vitals:    12/17/24 1506   BP: 109/76   Pulse: 70   Weight: 56.3 kg (124 lb 1.9 oz)   Height: 5' 5" (1.651 m)          General:  unremarkable, age appropriate     Musculoskeletal  Muscle Strength/Tone:  no spasicity, no rigidity, no flaccidity   Gait & Station:  non-ataxic     Psychiatric  Speech:  no latency; no press   Mood & Affect:  steady  congruent and appropriate   Thought Process:  normal and logical   Associations:  intact   Thought Content:  normal, no suicidality, no homicidality, delusions, or paranoia   Insight:  has awareness of illness   Judgement: age appropriate   Orientation:  grossly intact "   Memory: intact for content of interview   Language: grossly intact   Attention Span & Concentration:  able to focus   Fund of Knowledge:  intact and appropriate to age and level of education, familiar with aspects of current personal life     Assessment and Diagnosis   Status/Progress: Based on the examination today, the patient's problem(s) is/are well controlled.  New problems have not been presented today.   Co-morbidities are not complicating management of the primary condition.       General Impression:  Patient reports mild improvement in mood swings and irritability. Continued improvement in motivation.  Reports some increase in anxiety.  Reports decreased use of marijuana.  Denies noticeable side effects of medications.   Denies wanting changes to medication at this time.  Patient agreeable to current treatment plan.  Patient denies suicidal ideations, homicidal ideations, thoughts of self-harm, paranoia and hallucinations.    Visit today included increased complexity associated with the care of the episodic problem see below addressed and managing the longitudinal care of the patient due to the serious and/or complex managed problem(s) see below.      ICD-10-CM ICD-9-CM   1. CHARLES (generalized anxiety disorder)  F41.1 300.02   2. Mood disorder  F39 296.90   3. Tetrahydrocannabinol (THC) use disorder, mild, abuse  F12.10 305.20   4. Reactive depression  F32.9 300.4     Rule out PMDD    Intervention/Counseling/Treatment Plan   Medication Management: The risks and benefits of medication were discussed with the patient.  Counseling provided with patient and family as follows: importance of compliance with chosen treatment options was emphasized, risks and benefits of treatment options, including medications, were discussed with the patient, prognosis, patient and family education, instructions for  management, treatment and follow-up were reviewed   Educated on Black Box warning for SSRI's with younger patients  and suicidality. Instructed to go to ER or call 911 if thoughts of suicide begin or worsen. Patient verbalized understanding.   Discussed risk of serotonin syndrome with these medications. Symptoms of concern include agitation/restlessness, confusion, rapid heart rate/high blood pressure, dilated pupils, loss of muscle coordination, muscle rigidity, heavy sweating.  Discussed with individual potential for birth defects and possible other adverse impact upon pregnancy and maternal/fetal health while taking psychotropic medications.   Discussed with patient informed consent, risks vs. benefits, alternative treatments, side effect profile and the inherent unpredictability of individual responses to these treatments. The patient expresses understanding of the above and displays the capacity to agree with this current plan and had no other questions.      Medications:   Continue lamotrigine 25 mg by mouth daily.  May take take Propanolol 20 mg by mouth twice daily as needed for anxiety.       Return to Clinic: 3 months     Patient instructed to please go to emergency department if feeling as though you are going to harm to yourself or others or if you are in crisis; or to please call the clinic to report any worsening of symptoms or problems associated with medication.     A portion of this note was created using [employer] voice recognition software that occasionally misinterprets phrases or words.

## 2025-03-10 ENCOUNTER — TELEPHONE (OUTPATIENT)
Dept: PSYCHIATRY | Facility: CLINIC | Age: 20
End: 2025-03-10
Payer: COMMERCIAL

## 2025-03-10 NOTE — TELEPHONE ENCOUNTER
Lmor that appt on 3-18-25 needs to be rescheduled. Either call the office back or respond to portal message sent.

## 2025-03-24 ENCOUNTER — OFFICE VISIT (OUTPATIENT)
Dept: PSYCHIATRY | Facility: CLINIC | Age: 20
End: 2025-03-24
Payer: COMMERCIAL

## 2025-03-24 ENCOUNTER — LAB VISIT (OUTPATIENT)
Dept: LAB | Facility: HOSPITAL | Age: 20
End: 2025-03-24
Attending: REGISTERED NURSE
Payer: COMMERCIAL

## 2025-03-24 VITALS
HEART RATE: 106 BPM | DIASTOLIC BLOOD PRESSURE: 72 MMHG | SYSTOLIC BLOOD PRESSURE: 112 MMHG | WEIGHT: 116.94 LBS | BODY MASS INDEX: 19.46 KG/M2

## 2025-03-24 DIAGNOSIS — F39 MOOD DISORDER: ICD-10-CM

## 2025-03-24 DIAGNOSIS — R53.83 FATIGUE, UNSPECIFIED TYPE: ICD-10-CM

## 2025-03-24 DIAGNOSIS — Z79.899 ENCOUNTER FOR LONG-TERM CURRENT USE OF MEDICATION: ICD-10-CM

## 2025-03-24 DIAGNOSIS — F41.1 GAD (GENERALIZED ANXIETY DISORDER): Primary | ICD-10-CM

## 2025-03-24 LAB
ABSOLUTE EOSINOPHIL (SMH): 0.08 K/UL
ABSOLUTE MONOCYTE (SMH): 0.44 K/UL (ref 0.3–1)
ABSOLUTE NEUTROPHIL COUNT (SMH): 5.1 K/UL (ref 1.8–7.7)
ALBUMIN SERPL-MCNC: 4.1 G/DL (ref 3.5–5.2)
ALP SERPL-CCNC: 46 UNIT/L (ref 55–135)
ALT SERPL-CCNC: 13 UNIT/L (ref 10–44)
ANION GAP (SMH): 9 MMOL/L (ref 8–16)
AST SERPL-CCNC: 13 UNIT/L (ref 10–40)
BASOPHILS # BLD AUTO: 0.03 K/UL
BASOPHILS NFR BLD AUTO: 0.4 %
BILIRUB SERPL-MCNC: 0.2 MG/DL (ref 0.1–1)
BUN SERPL-MCNC: 9 MG/DL (ref 6–20)
CALCIUM SERPL-MCNC: 9.6 MG/DL (ref 8.7–10.5)
CHLORIDE SERPL-SCNC: 105 MMOL/L (ref 95–110)
CHOLEST SERPL-MCNC: 158 MG/DL (ref 120–199)
CHOLEST/HDLC SERPL: 2.5 {RATIO} (ref 2–5)
CO2 SERPL-SCNC: 25 MMOL/L (ref 23–29)
CREAT SERPL-MCNC: 0.5 MG/DL (ref 0.5–1.4)
ERYTHROCYTE [DISTWIDTH] IN BLOOD BY AUTOMATED COUNT: 12.2 % (ref 11.5–14.5)
GFR SERPLBLD CREATININE-BSD FMLA CKD-EPI: >60 ML/MIN/1.73/M2
GLUCOSE SERPL-MCNC: 85 MG/DL (ref 70–110)
HCT VFR BLD AUTO: 37.9 % (ref 37–48.5)
HDLC SERPL-MCNC: 64 MG/DL (ref 40–75)
HDLC SERPL: 40.5 % (ref 20–50)
HGB BLD-MCNC: 12.6 GM/DL (ref 12–16)
IMM GRANULOCYTES # BLD AUTO: 0.03 K/UL (ref 0–0.04)
IMM GRANULOCYTES NFR BLD AUTO: 0.4 % (ref 0–0.5)
IRON SATN MFR SERPL: 12 % (ref 20–50)
IRON SERPL-MCNC: 59 UG/DL (ref 30–160)
LDLC SERPL CALC-MCNC: 73.8 MG/DL (ref 63–159)
LYMPHOCYTES # BLD AUTO: 2.1 K/UL (ref 1–4.8)
MAGNESIUM SERPL-MCNC: 1.7 MG/DL (ref 1.6–2.6)
MCH RBC QN AUTO: 30.4 PG (ref 27–31)
MCHC RBC AUTO-ENTMCNC: 33.2 G/DL (ref 32–36)
MCV RBC AUTO: 92 FL (ref 82–98)
NONHDLC SERPL-MCNC: 94 MG/DL
NUCLEATED RBC (/100WBC) (SMH): 0 /100 WBC
PLATELET # BLD AUTO: 369 K/UL (ref 150–450)
PMV BLD AUTO: 9.7 FL (ref 9.2–12.9)
POTASSIUM SERPL-SCNC: 4.4 MMOL/L (ref 3.5–5.1)
PROT SERPL-MCNC: 7.2 GM/DL (ref 6–8.4)
RBC # BLD AUTO: 4.14 M/UL (ref 4–5.4)
RELATIVE EOSINOPHIL (SMH): 1 % (ref 0–8)
RELATIVE LYMPHOCYTE (SMH): 26.9 % (ref 18–48)
RELATIVE MONOCYTE (SMH): 5.6 % (ref 4–15)
RELATIVE NEUTROPHIL (SMH): 65.7 % (ref 38–73)
SODIUM SERPL-SCNC: 139 MMOL/L (ref 136–145)
T4 FREE SERPL-MCNC: 0.88 NG/DL (ref 0.71–1.51)
TIBC SERPL-MCNC: 477 UG/DL (ref 250–450)
TRANSFERRIN SERPL-MCNC: 341 MG/DL (ref 200–375)
TRIGL SERPL-MCNC: 101 MG/DL (ref 30–150)
TSH SERPL-ACNC: 2.28 UIU/ML (ref 0.34–5.6)
WBC # BLD AUTO: 7.8 K/UL (ref 3.9–12.7)

## 2025-03-24 PROCEDURE — 84443 ASSAY THYROID STIM HORMONE: CPT

## 2025-03-24 PROCEDURE — 3078F DIAST BP <80 MM HG: CPT | Mod: CPTII,S$GLB,, | Performed by: REGISTERED NURSE

## 2025-03-24 PROCEDURE — 85025 COMPLETE CBC W/AUTO DIFF WBC: CPT

## 2025-03-24 PROCEDURE — 1160F RVW MEDS BY RX/DR IN RCRD: CPT | Mod: CPTII,S$GLB,, | Performed by: REGISTERED NURSE

## 2025-03-24 PROCEDURE — 36415 COLL VENOUS BLD VENIPUNCTURE: CPT

## 2025-03-24 PROCEDURE — 99999 PR PBB SHADOW E&M-EST. PATIENT-LVL III: CPT | Mod: PBBFAC,,, | Performed by: REGISTERED NURSE

## 2025-03-24 PROCEDURE — 84439 ASSAY OF FREE THYROXINE: CPT

## 2025-03-24 PROCEDURE — 99215 OFFICE O/P EST HI 40 MIN: CPT | Mod: S$GLB,,, | Performed by: REGISTERED NURSE

## 2025-03-24 PROCEDURE — 3074F SYST BP LT 130 MM HG: CPT | Mod: CPTII,S$GLB,, | Performed by: REGISTERED NURSE

## 2025-03-24 PROCEDURE — 82247 BILIRUBIN TOTAL: CPT

## 2025-03-24 PROCEDURE — G2211 COMPLEX E/M VISIT ADD ON: HCPCS | Mod: S$GLB,,, | Performed by: REGISTERED NURSE

## 2025-03-24 PROCEDURE — 83540 ASSAY OF IRON: CPT

## 2025-03-24 PROCEDURE — 3008F BODY MASS INDEX DOCD: CPT | Mod: CPTII,S$GLB,, | Performed by: REGISTERED NURSE

## 2025-03-24 PROCEDURE — 83718 ASSAY OF LIPOPROTEIN: CPT

## 2025-03-24 PROCEDURE — 1159F MED LIST DOCD IN RCRD: CPT | Mod: CPTII,S$GLB,, | Performed by: REGISTERED NURSE

## 2025-03-24 PROCEDURE — 83735 ASSAY OF MAGNESIUM: CPT

## 2025-03-24 RX ORDER — LAMOTRIGINE 25 MG/1
25 TABLET ORAL DAILY
Qty: 90 TABLET | Refills: 1 | Status: SHIPPED | OUTPATIENT
Start: 2025-03-24 | End: 2025-09-20

## 2025-03-24 NOTE — PATIENT INSTRUCTIONS
Continue Lamotrigine 25 mg by mouth daily.      May take take Propanolol 20 mg by mouth twice daily as needed for anxiety.             Please go to emergency department if feeling as though you are going to harm to yourself or others or if you are in crisis.     Please call the clinic to report any worsening of symptoms or problems associated with medication.    National Suicide Prevention Lifeline    The Lifeline provides 24/7, free and confidential support for people in distress, prevention and crisis resources for you or your loved ones, and best practices for professionals in the [place].    [phone]    988 has been designated as the new three-digit dialing code that will route callers to the National Suicide Prevention Lifeline. While some areas may be currently able to connect to the Lifeline by dialing 988, this dialing code will be available to everyone across the [place] starting on July 16, 2022.     988      Lifeline Chat    Lifeline Chat is a service of the National Suicide Prevention Lifeline, connecting individuals with counselors for emotional support and other services via web chat. All chat centers in the Lifeline network are accredited by [employer]. Lifeline Chat is available 24/7 across the [place]    [email or web address]e.org/chat/

## 2025-03-24 NOTE — PROGRESS NOTES
Outpatient Psychiatry Follow-Up Visit (MD/NP)    3/24/2025    Clinical Status of Patient:  Outpatient (Ambulatory)    Chief Complaint:  Olivia Magaña is a 19 y.o. female who presents today for follow-up of depression and anxiety.  Met with patient.    Grade: College Soph[employer]  School:  [employer] - anthropology/ studies  Child lives with: parents/ roommates   Job: [employer]    Interval History and Content of Current Session:  Interim Events/Subjective Report/Content of Current Session:   Patient recently received a promotion at work.  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  States she will being [place] for field work.  Considering attending graduate school versus working after graduation.  Does report increase in fatigue and tiredness.  Denies noticeable side effects of medications otherwise.  Reports good sleep.  Reports good appetite.    12/17/2024:  Patient reports I am really happy.  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  Recently broke up with girlfriend.  Made all A's this semester.  Attempting to find new roommate although trying to prevent drama in household.  Taking propanolol proximally 1 time per week for anxiety.  Otherwise denies noticeable side effects of medications.  Reports good sleep.  Reports good appetite.    08/20/2024:  Reports significant improvement in irritability.  States getting along with new roommate well.  Does report some increase in anxiety over school starting back.  States taking 6 classes this semester.  Does report that propanolol seems to :  be not as effective as previously for anxiety episodes.  Otherwise denies noticeable side effects of medications.  Reports good sleep.  Reports good appetite.      10/7/2021-initial evaluation: Patient is a 16-year-old female who presents to clinic today for initial psychiatric evaluation by this provider.  Patient presents with complaints  of anxiety and depression.  Patient's grandmother is present with patient during interview.  Patient reports wanting to go to [employer] for forensic anthropology.  States she has had anxiety for most of her life, however it has been way worse in the past year.  Reports tic starting in April that affected the neck torso and caused the patient to home.  States the tics affect patient dated days.  Patient often fixates on different things such as talking to people and forgets keys or other things.  Depression started approximately 2 years ago.  Currently the patient sees Pam read an occupational therapist online for [facility] for the tics for approximately 4 weeks.  Reports the [facility] seems to be helping.  Patient admits to crying daily when taking the school due to anxiety.  Often she has difficulty in public places and meeting new people, or even being alone by herself at home.  Patient has panic attacks that feel like a weight on her chest, unable to talk, excessive crying, moving arms around leading to her tics.  Patient reports having abandonment issues from people.  States she gets really close to her friends and when someone moves or she feels as if she is blocked from their life is very difficult for the patient.  The patient has started on Zoloft and reports she can feel herself being sad or cry by herself.  Does report it has affected her appetite somewhat.  Patient admits to increased depression and irritability the week before her menstrual cycle.  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  Patient denies current suicidal ideations, homicidal ideation, thoughts of self-harm, paranoia and hallucinations.       Patient  reviewed this visit.     Review of Systems   PSYCHIATRIC: Pertinant items are noted in the narrative.    Past Medical, Family and Social History: The patient's past medical, family and social history have been reviewed and updated as  appropriate within the electronic medical record - see encounter notes.    Compliance:  See above    Side effects: see above    Risk Parameters:  Patient reports no suicidal ideation  Patient reports no homicidal ideation  Patient reports no self-injurious behavior  Patient reports no violent behavior    Exam (detailed: at least 9 elements; comprehensive: all 15 elements)         8/17/2023    12:56 PM 8/16/2023     6:21 PM 7/7/2023    10:43 AM   GAD7   1. Feeling nervous, anxious, or on edge? 1 1 1    2. Not being able to stop or control worrying? 2 2 1    3. Worrying too much about different things? 2 2 1    4. Trouble relaxing? 3 3 1    5. Being so restless that it is hard to sit still? 1 1 1    6. Becoming easily annoyed or irritable? 3 3 1    7. Feeling afraid as if something awful might happen? 1 1 1    8. If you checked off any problems, how difficult have these problems made it for you to do your work, take care of things at home, or get along with other people? 1 1 1    CHARLES-7 Score 13 13    13 7       Proxy-reported          No data to display              Constitutional  Vitals:  Most recent vital signs, dated less than 90 days prior to this appointment, were reviewed.   Vitals:    03/24/25 0854   BP: 112/72   Pulse: 106   Weight: 53 kg (116 lb 15.3 oz)      General:  unremarkable, age appropriate     Musculoskeletal  Muscle Strength/Tone:  no spasicity, no rigidity, no flaccidity   Gait & Station:  non-ataxic     Psychiatric  Speech:  no latency; no press   Mood & Affect:  steady  congruent and appropriate   Thought Process:  normal and logical   Associations:  intact   Thought Content:  normal, no suicidality, no homicidality, delusions, or paranoia   Insight:  has awareness of illness   Judgement: age appropriate   Orientation:  grossly intact   Memory: intact for content of interview   Language: grossly intact   Attention Span & Concentration:  able to focus   Fund of Knowledge:  intact and appropriate  to age and level of education, familiar with aspects of current personal life     Assessment and Diagnosis   Status/Progress: Based on the examination today, the patient's problem(s) is/are well controlled.  New problems have not been presented today.   Co-morbidities are not complicating management of the primary condition.       General Impression:  Patient reports mild improvement in mood swings and irritability. Continued improvement in motivation.  Reports some increase in anxiety.  Reports decreased use of marijuana.  Reports some increase in fatigue.  Otherwise denies noticeable side effects of medications.   Denies wanting changes to medication at this time.  Patient agreeable to current treatment plan.  Patient denies suicidal ideations, homicidal ideations, thoughts of self-harm, paranoia and hallucinations.    Visit today included increased complexity associated with the care of the episodic problem see below addressed and managing the longitudinal care of the patient due to the serious and/or complex managed problem(s) see below.      ICD-10-CM ICD-9-CM   1. CHARLES (generalized anxiety disorder)  F41.1 300.02   2. Mood disorder  F39 296.90   3. Fatigue, unspecified type  R53.83 780.79   4. Encounter for long-term current use of medication  Z79.899 V58.69     Rule out PMDD    Intervention/Counseling/Treatment Plan   Medication Management: The risks and benefits of medication were discussed with the patient.  Counseling provided with patient and family as follows: importance of compliance with chosen treatment options was emphasized, risks and benefits of treatment options, including medications, were discussed with the patient, prognosis, patient and family education, instructions for  management, treatment and follow-up were reviewed   Educated on Black Box warning for SSRI's with younger patients and suicidality. Instructed to go to ER or call 911 if thoughts of suicide begin or worsen. Patient verbalized  understanding.   Discussed risk of serotonin syndrome with these medications. Symptoms of concern include agitation/restlessness, confusion, rapid heart rate/high blood pressure, dilated pupils, loss of muscle coordination, muscle rigidity, heavy sweating.  Discussed with individual potential for birth defects and possible other adverse impact upon pregnancy and maternal/fetal health while taking psychotropic medications.   Discussed with patient informed consent, risks vs. benefits, alternative treatments, side effect profile and the inherent unpredictability of individual responses to these treatments. The patient expresses understanding of the above and displays the capacity to agree with this current plan and had no other questions.  Labs ordered:  CBC, CMP, TSH, lipid panel, T4, iron and TIBC, magnesium  Establish with PCP      Medications:   Continue lamotrigine 25 mg by mouth daily.  May take take Propanolol 20 mg by mouth twice daily as needed for anxiety.       Return to Clinic: 3 months     Total time spent with patient and chart: 41 minutes    Patient instructed to please go to emergency department if feeling as though you are going to harm to yourself or others or if you are in crisis; or to please call the clinic to report any worsening of symptoms or problems associated with medication.     A portion of this note was created using [employer] voice recognition software that occasionally misinterprets phrases or words.

## 2025-03-25 ENCOUNTER — RESULTS FOLLOW-UP (OUTPATIENT)
Dept: PSYCHIATRY | Facility: CLINIC | Age: 20
End: 2025-03-25

## 2025-07-08 ENCOUNTER — OFFICE VISIT (OUTPATIENT)
Dept: PSYCHIATRY | Facility: CLINIC | Age: 20
End: 2025-07-08
Payer: COMMERCIAL

## 2025-07-08 VITALS
BODY MASS INDEX: 20.17 KG/M2 | SYSTOLIC BLOOD PRESSURE: 106 MMHG | HEIGHT: 65 IN | HEART RATE: 90 BPM | DIASTOLIC BLOOD PRESSURE: 75 MMHG | WEIGHT: 121.06 LBS

## 2025-07-08 DIAGNOSIS — F41.1 GAD (GENERALIZED ANXIETY DISORDER): Primary | ICD-10-CM

## 2025-07-08 DIAGNOSIS — F12.10 TETRAHYDROCANNABINOL (THC) USE DISORDER, MILD, ABUSE: ICD-10-CM

## 2025-07-08 DIAGNOSIS — F39 MOOD DISORDER: ICD-10-CM

## 2025-07-08 DIAGNOSIS — F32.9 REACTIVE DEPRESSION: ICD-10-CM

## 2025-07-08 PROCEDURE — 3008F BODY MASS INDEX DOCD: CPT | Mod: CPTII,S$GLB,, | Performed by: REGISTERED NURSE

## 2025-07-08 PROCEDURE — G2211 COMPLEX E/M VISIT ADD ON: HCPCS | Mod: S$GLB,,, | Performed by: REGISTERED NURSE

## 2025-07-08 PROCEDURE — 99214 OFFICE O/P EST MOD 30 MIN: CPT | Mod: S$GLB,,, | Performed by: REGISTERED NURSE

## 2025-07-08 PROCEDURE — 3078F DIAST BP <80 MM HG: CPT | Mod: CPTII,S$GLB,, | Performed by: REGISTERED NURSE

## 2025-07-08 PROCEDURE — 1159F MED LIST DOCD IN RCRD: CPT | Mod: CPTII,S$GLB,, | Performed by: REGISTERED NURSE

## 2025-07-08 PROCEDURE — 3074F SYST BP LT 130 MM HG: CPT | Mod: CPTII,S$GLB,, | Performed by: REGISTERED NURSE

## 2025-07-08 PROCEDURE — 99999 PR PBB SHADOW E&M-EST. PATIENT-LVL III: CPT | Mod: PBBFAC,,, | Performed by: REGISTERED NURSE

## 2025-07-08 PROCEDURE — 1160F RVW MEDS BY RX/DR IN RCRD: CPT | Mod: CPTII,S$GLB,, | Performed by: REGISTERED NURSE

## 2025-07-08 RX ORDER — LAMOTRIGINE 25 MG/1
50 TABLET ORAL DAILY
Qty: 180 TABLET | Refills: 1 | Status: SHIPPED | OUTPATIENT
Start: 2025-07-08 | End: 2026-01-04

## 2025-07-08 NOTE — PROGRESS NOTES
Outpatient Psychiatry Follow-Up Visit (MD/NP)    7/8/2025    Clinical Status of Patient:  Outpatient (Ambulatory)    Chief Complaint:  Olivia Magaña is a 19 y.o. female who presents today for follow-up of depression and anxiety.  Met with patient.    Grade: College [employer]  School:  [employer] - anthropology/ studies  Child lives with: parents/ roommates   Job: [employer]    Interval History and Content of Current Session:  Interim Events/Subjective Report/Content of Current Session:   Returned from field work assignment 2 weeks ago.  Reports crying daily for 1st week.  Felt mood changes related to poor environment and difficulty with peers.  However by the end of the program did not want to leave.  Is moving into a new apartment with a friend in his hopeful for less stress in this situation.  Has been eating more meat to help with iron levels.  Does continue to have daytime tiredness although was better while doing field work for school.  Continues with THC usage approximately twice weekly.  Does report some recent increase in anxiety although feels it may be related to moving and field work environment.  States difficulty falling asleep for 12/12/2030 and continues to wake up at 6:30 a.m. daily.  Improvement in appetite.  Otherwise denies noticeable side effects of medications.    03/24/2025:  Patient recently received a promotion at work.  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  States she will being [employer] [facility] [employer] for field work.  Considering attending graduate school versus working after graduation.  Does report increase in fatigue and tiredness.  Denies noticeable side effects of medications otherwise.  Reports good sleep.  Reports good appetite.    12/17/2024:  Patient reports I am really happy.  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  Recently broke up with girlfriend.  Made all A's this semester.  Attempting to  find new roommate although trying to prevent drama in household.  Taking propanolol proximally 1 time per week for anxiety.  Otherwise denies noticeable side effects of medications.  Reports good sleep.  Reports good appetite.      10/7/2021-initial evaluation: Patient is a 16-year-old female who presents to clinic today for initial psychiatric evaluation by this provider.  Patient presents with complaints of anxiety and depression.  Patient's grandmother is present with patient during interview.  Patient reports wanting to go to [employer] for forensic anthropology.  States she has had anxiety for most of her life, however it has been way worse in the past year.  Reports tic starting in April that affected the neck torso and caused the patient to home.  States the tics affect patient dated days.  Patient often fixates on different things such as talking to people and forgets keys or other things.  Depression started approximately 2 years ago.  Currently the patient sees Pam read an occupational therapist online for [facility] for the tics for approximately 4 weeks.  Reports the [facility] [facility] seems to be helping.  Patient admits to crying daily when taking the school due to anxiety.  Often she has difficulty in public places and meeting new people, or even being alone by herself at home.  Patient has panic attacks that feel like a weight on her chest, unable to talk, excessive crying, moving arms around leading to her tics.  Patient reports having abandonment issues from people.  States she gets really close to her friends and when someone moves or she feels as if she is blocked from their life is very difficult for the patient.  The patient has started on Zoloft and reports she can feel herself being sad or cry by herself.  Does report it has affected her appetite somewhat.  Patient admits to increased depression and irritability the week before her menstrual cycle.  The patient is a senior in high school and  "works for [employer] currently.  Patient denies current suicidal ideations, homicidal ideation, thoughts of self-harm, paranoia and hallucinations.       Patient  reviewed this visit.     Review of Systems   PSYCHIATRIC: Pertinant items are noted in the narrative.    Past Medical, Family and Social History: The patient's past medical, family and social history have been reviewed and updated as appropriate within the electronic medical record - see encounter notes.    Compliance:  See above    Side effects: see above    Risk Parameters:  Patient reports no suicidal ideation  Patient reports no homicidal ideation  Patient reports no self-injurious behavior  Patient reports no violent behavior    Exam (detailed: at least 9 elements; comprehensive: all 15 elements)         8/17/2023    12:56 PM 8/16/2023     6:21 PM 7/7/2023    10:43 AM   GAD7   1. Feeling nervous, anxious, or on edge? 1 1 1    2. Not being able to stop or control worrying? 2 2 1    3. Worrying too much about different things? 2 2 1    4. Trouble relaxing? 3 3 1    5. Being so restless that it is hard to sit still? 1 1 1    6. Becoming easily annoyed or irritable? 3 3 1    7. Feeling afraid as if something awful might happen? 1 1 1    8. If you checked off any problems, how difficult have these problems made it for you to do your work, take care of things at home, or get along with other people? 1 1 1    CHARLES-7 Score 13 13    13 7       Proxy-reported          No data to display              Constitutional  Vitals:  Most recent vital signs, dated less than 90 days prior to this appointment, were reviewed.   Vitals:    07/08/25 0922   BP: 106/75   Pulse: 90   Weight: 54.9 kg (121 lb 0.5 oz)   Height: 5' 5" (1.651 m)      General:  unremarkable, age appropriate     Musculoskeletal  Muscle Strength/Tone:  no spasicity, no rigidity, no flaccidity   Gait & Station:  non-ataxic     Psychiatric  Speech:  no latency; no press   Mood & Affect:  " steady  congruent and appropriate   Thought Process:  normal and logical   Associations:  intact   Thought Content:  normal, no suicidality, no homicidality, delusions, or paranoia   Insight:  has awareness of illness   Judgement: age appropriate   Orientation:  grossly intact   Memory: intact for content of interview   Language: grossly intact   Attention Span & Concentration:  able to focus   Fund of Knowledge:  intact and appropriate to age and level of education, familiar with aspects of current personal life     Assessment and Diagnosis   Status/Progress: Based on the examination today, the patient's problem(s) is/are adequately but not ideally controlled.  New problems have been presented today.   Co-morbidities are not complicating management of the primary condition.       General Impression:  Mild improvement in mood swings.  Mild increase in irritability. Continued improvement in motivation.  Mild increase in anxiety.  Continued decreased use of marijuana.  Increased difficulty falling asleep.  Increased daytime tiredness..  Otherwise denies noticeable side effects of medications.  Discussed increasing lamotrigine for mood.  Discussed risks versus benefits of medication changes.  Patient agreeable to current treatment plan.  Patient denies suicidal ideations, homicidal ideations, thoughts of self-harm, paranoia and hallucinations.    Visit today included increased complexity associated with the care of the episodic problem see below addressed and managing the longitudinal care of the patient due to the serious and/or complex managed problem(s) see below.      ICD-10-CM ICD-9-CM   1. CHARLES (generalized anxiety disorder)  F41.1 300.02   2. Tetrahydrocannabinol (THC) use disorder, mild, abuse  F12.10 305.20   3. Mood disorder  F39 296.90   4. Reactive depression  F32.9 300.4     Rule out PMDD    Intervention/Counseling/Treatment Plan   Medication Management: The risks and benefits of medication were discussed  with the patient.  Counseling provided with patient and family as follows: importance of compliance with chosen treatment options was emphasized, risks and benefits of treatment options, including medications, were discussed with the patient, prognosis, patient and family education, instructions for  management, treatment and follow-up were reviewed   Educated on Black Box warning for SSRI's with younger patients and suicidality. Instructed to go to ER or call 911 if thoughts of suicide begin or worsen. Patient verbalized understanding.   Discussed risk of serotonin syndrome with these medications. Symptoms of concern include agitation/restlessness, confusion, rapid heart rate/high blood pressure, dilated pupils, loss of muscle coordination, muscle rigidity, heavy sweating.  Discussed with individual potential for birth defects and possible other adverse impact upon pregnancy and maternal/fetal health while taking psychotropic medications.   Discussed with patient informed consent, risks vs. benefits, alternative treatments, side effect profile and the inherent unpredictability of individual responses to these treatments. The patient expresses understanding of the above and displays the capacity to agree with this current plan and had no other questions.  Establish with PCP      Medications:   Increase lamotrigine 50 mg by mouth daily.  May take take Propanolol 20 mg by mouth twice daily as needed for anxiety.       Return to Clinic: 3 months     Patient instructed to please go to emergency department if feeling as though you are going to harm to yourself or others or if you are in crisis; or to please call the clinic to report any worsening of symptoms or problems associated with medication.     A portion of this note was created using [employer] voice recognition software that occasionally misinterprets phrases or words.

## 2025-07-08 NOTE — PATIENT INSTRUCTIONS
Increase Lamotrigine 50 mg by mouth daily.      May take take Propanolol 20 mg by mouth twice daily as needed for anxiety.             Please go to emergency department if feeling as though you are going to harm to yourself or others or if you are in crisis.     Please call the clinic to report any worsening of symptoms or problems associated with medication.    National Suicide Prevention Lifeline    The Lifeline provides 24/7, free and confidential support for people in distress, prevention and crisis resources for you or your loved ones, and best practices for professionals in the [place].    2-392-465-0773    988 has been designated as the new three-digit dialing code that will route callers to the National Suicide Prevention Lifeline. While some areas may be currently able to connect to the Lifeline by dialing 988, this dialing code will be available to everyone across the [place] starting on July 16, 2022.     988      Lifeline Chat    Lifeline Chat is a service of the National Suicide Prevention Lifeline, connecting individuals with counselors for emotional support and other services via web chat. All chat centers in the Lifeline network are accredited by [employer]. Lifeline Chat is available 24/7 across the [place]    https://suicidepreventionlifeline.org/chat/